# Patient Record
Sex: FEMALE | Race: WHITE | URBAN - METROPOLITAN AREA
[De-identification: names, ages, dates, MRNs, and addresses within clinical notes are randomized per-mention and may not be internally consistent; named-entity substitution may affect disease eponyms.]

---

## 2022-09-23 ENCOUNTER — OFFICE VISIT (OUTPATIENT)
Dept: URBAN - METROPOLITAN AREA CLINIC 71 | Facility: CLINIC | Age: 62
End: 2022-09-23

## 2022-09-23 DIAGNOSIS — H50.00 ESOTROPIA: ICD-10-CM

## 2022-09-23 DIAGNOSIS — Z96.1 PRESENCE OF INTRAOCULAR LENS: ICD-10-CM

## 2022-09-23 DIAGNOSIS — H43.811 VITREOUS DEGENERATION, RIGHT EYE: ICD-10-CM

## 2022-09-23 DIAGNOSIS — H26.491 OTHER SECONDARY CATARACT, RIGHT EYE: Primary | ICD-10-CM

## 2022-09-23 DIAGNOSIS — H53.002 AMBLYOPIA OF LEFT EYE: ICD-10-CM

## 2022-09-23 PROCEDURE — 99202 OFFICE O/P NEW SF 15 MIN: CPT | Performed by: OPHTHALMOLOGY

## 2022-09-23 ASSESSMENT — VISUAL ACUITY
OD: 20/40
OS: LP

## 2022-09-23 ASSESSMENT — INTRAOCULAR PRESSURE
OD: 10
OS: 10

## 2022-09-23 NOTE — IMPRESSION/PLAN
Impression: Other secondary cataract, right eye: H26.491. Discussed diagnosis and option of a YAG laser. Discussed details of procedure. Recommending a YAG laser for OD. Plan: Patient is not feeling bothered enough with the vision to want a YAG. Will monitor for changes.  Return in 6 months for dilation and OCT testing for a YAG eval.

## 2023-02-01 ENCOUNTER — OFFICE VISIT (OUTPATIENT)
Dept: URBAN - METROPOLITAN AREA CLINIC 71 | Facility: CLINIC | Age: 63
End: 2023-02-01

## 2023-02-01 DIAGNOSIS — Z96.1 PRESENCE OF INTRAOCULAR LENS: ICD-10-CM

## 2023-02-01 DIAGNOSIS — H43.811 VITREOUS DEGENERATION, RIGHT EYE: ICD-10-CM

## 2023-02-01 DIAGNOSIS — H53.002 AMBLYOPIA OF LEFT EYE: ICD-10-CM

## 2023-02-01 DIAGNOSIS — H26.491 OTHER SECONDARY CATARACT, RIGHT EYE: Primary | ICD-10-CM

## 2023-02-01 PROCEDURE — 92134 CPTRZ OPH DX IMG PST SGM RTA: CPT | Performed by: OPHTHALMOLOGY

## 2023-02-01 PROCEDURE — 99213 OFFICE O/P EST LOW 20 MIN: CPT | Performed by: OPHTHALMOLOGY

## 2023-02-01 ASSESSMENT — VISUAL ACUITY
OD: 20/40
OS: LP

## 2023-02-01 ASSESSMENT — INTRAOCULAR PRESSURE
OD: 10
OS: 10

## 2023-02-01 NOTE — IMPRESSION/PLAN
Impression: Other secondary cataract, right eye: H26.491. Once again, discussed the option of a YAG laser, including the risks and benefits. Discussed with patient that since OD is the only seeing eye, we want to be cautious. Plan: Patient is not feeling bothered enough by the vision. Return in 6 months for dilation and OCT testing.

## 2023-02-01 NOTE — IMPRESSION/PLAN
Impression: Vitreous degeneration, right eye: H43.811. OCT ordered today and reviewed. No holes or tears seen. Plan: Contact office if symptoms worsen, including increased floaters, flashing light, loss of vision or a black curtain blocking your field of vision.

## 2023-02-01 NOTE — IMPRESSION/PLAN
Impression: Amblyopia of left eye: H53.002. Plan: Monitor. Will also refer the patient to a retinal specialist since we have no view of the retina.

## 2023-07-21 ENCOUNTER — PROCEDURE (OUTPATIENT)
Dept: URBAN - METROPOLITAN AREA CLINIC 71 | Facility: CLINIC | Age: 63
End: 2023-07-21

## 2023-07-21 DIAGNOSIS — H35.162: ICD-10-CM

## 2023-07-21 DIAGNOSIS — H35.111: Primary | ICD-10-CM

## 2023-07-21 PROCEDURE — 92235 FLUORESCEIN ANGRPH MLTIFRAME: CPT | Performed by: OPHTHALMOLOGY

## 2023-07-21 PROCEDURE — 99214 OFFICE O/P EST MOD 30 MIN: CPT | Performed by: OPHTHALMOLOGY

## 2023-07-21 PROCEDURE — 92250 FUNDUS PHOTOGRAPHY W/I&R: CPT | Performed by: OPHTHALMOLOGY

## 2023-07-21 ASSESSMENT — INTRAOCULAR PRESSURE
OD: 9
OS: 12

## 2023-07-21 NOTE — IMPRESSION/PLAN
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 Plan: [facility]. Observe. No tumor noted on B-scan.

## 2023-07-21 NOTE — IMPRESSION/PLAN
Impression: Retinopathy of prematurity, stage 0, right eye: H35.111. Plan: No leakage on FA today, RTC 6 months DFEx/OCT mac/FP.

## 2023-12-27 ENCOUNTER — OFFICE VISIT (OUTPATIENT)
Dept: URBAN - METROPOLITAN AREA CLINIC 71 | Facility: CLINIC | Age: 63
End: 2023-12-27

## 2023-12-27 DIAGNOSIS — H26.491 OTHER SECONDARY CATARACT, RIGHT EYE: Primary | ICD-10-CM

## 2023-12-27 DIAGNOSIS — Z96.1 PRESENCE OF INTRAOCULAR LENS: ICD-10-CM

## 2023-12-27 DIAGNOSIS — H43.811 VITREOUS DEGENERATION, RIGHT EYE: ICD-10-CM

## 2023-12-27 DIAGNOSIS — H53.002 AMBLYOPIA OF LEFT EYE: ICD-10-CM

## 2023-12-27 PROCEDURE — 99214 OFFICE O/P EST MOD 30 MIN: CPT | Performed by: OPHTHALMOLOGY

## 2023-12-27 PROCEDURE — 92134 CPTRZ OPH DX IMG PST SGM RTA: CPT | Performed by: OPHTHALMOLOGY

## 2023-12-27 ASSESSMENT — VISUAL ACUITY
OD: 20/30
OS: LP

## 2023-12-27 ASSESSMENT — INTRAOCULAR PRESSURE: OD: 17

## 2024-03-14 ENCOUNTER — SURGERY (OUTPATIENT)
Dept: URBAN - METROPOLITAN AREA SURGERY 45 | Facility: SURGERY | Age: 64
End: 2024-03-14

## 2024-03-14 ENCOUNTER — SURGERY (OUTPATIENT)
Dept: URBAN - METROPOLITAN AREA SURGERY 44 | Facility: SURGERY | Age: 64
End: 2024-03-14

## 2024-03-14 PROCEDURE — 66821 AFTER CATARACT LASER SURGERY: CPT | Performed by: OPHTHALMOLOGY

## 2024-03-25 ENCOUNTER — POST-OPERATIVE VISIT (OUTPATIENT)
Dept: URBAN - METROPOLITAN AREA CLINIC 71 | Facility: CLINIC | Age: 64
End: 2024-03-25

## 2024-03-25 DIAGNOSIS — Z48.810 ENCOUNTER FOR SURGICAL AFTERCARE FOLLOWING SURGERY ON A SENSE ORGAN: Primary | ICD-10-CM

## 2024-03-25 PROCEDURE — 99024 POSTOP FOLLOW-UP VISIT: CPT | Performed by: OPHTHALMOLOGY

## 2024-03-25 ASSESSMENT — INTRAOCULAR PRESSURE
OS: 12
OD: 10